# Patient Record
Sex: MALE | Race: WHITE
[De-identification: names, ages, dates, MRNs, and addresses within clinical notes are randomized per-mention and may not be internally consistent; named-entity substitution may affect disease eponyms.]

---

## 2020-04-13 ENCOUNTER — HOSPITAL ENCOUNTER (EMERGENCY)
Dept: HOSPITAL 76 - ED | Age: 55
Discharge: HOME | End: 2020-04-13
Payer: SELF-PAY

## 2020-04-13 VITALS — DIASTOLIC BLOOD PRESSURE: 113 MMHG | SYSTOLIC BLOOD PRESSURE: 210 MMHG

## 2020-04-13 DIAGNOSIS — Z91.14: ICD-10-CM

## 2020-04-13 DIAGNOSIS — R00.0: Primary | ICD-10-CM

## 2020-04-13 DIAGNOSIS — I10: ICD-10-CM

## 2020-04-13 LAB
ALBUMIN DIAFP-MCNC: 4.8 G/DL (ref 3.2–5.5)
ALBUMIN/GLOB SERPL: 1.2 {RATIO} (ref 1–2.2)
ALP SERPL-CCNC: 52 IU/L (ref 42–121)
ALT SERPL W P-5'-P-CCNC: 43 IU/L (ref 10–60)
AMPHET UR QL SCN: NEGATIVE
ANION GAP SERPL CALCULATED.4IONS-SCNC: 12 MMOL/L (ref 6–13)
AST SERPL W P-5'-P-CCNC: 34 IU/L (ref 10–42)
BASOPHILS NFR BLD AUTO: 0.1 10^3/UL (ref 0–0.1)
BASOPHILS NFR BLD AUTO: 0.4 %
BENZODIAZ UR QL SCN: NEGATIVE
BILIRUB BLD-MCNC: 1 MG/DL (ref 0.2–1)
BUN SERPL-MCNC: 14 MG/DL (ref 6–20)
CALCIUM UR-MCNC: 10.1 MG/DL (ref 8.5–10.3)
CHLORIDE SERPL-SCNC: 95 MMOL/L (ref 101–111)
CO2 SERPL-SCNC: 27 MMOL/L (ref 21–32)
COCAINE UR-SCNC: NEGATIVE UMOL/L
CREAT SERPLBLD-SCNC: 0.8 MG/DL (ref 0.6–1.2)
EOSINOPHIL # BLD AUTO: 0 10^3/UL (ref 0–0.7)
EOSINOPHIL NFR BLD AUTO: 0.2 %
ERYTHROCYTE [DISTWIDTH] IN BLOOD BY AUTOMATED COUNT: 13.2 % (ref 12–15)
GLOBULIN SER-MCNC: 3.8 G/DL (ref 2.1–4.2)
GLUCOSE SERPL-MCNC: 165 MG/DL (ref 70–100)
HGB UR QL STRIP: 15 G/DL (ref 14–18)
INR PPP: 1.1 (ref 0.8–1.2)
LIPASE SERPL-CCNC: 28 U/L (ref 22–51)
LYMPHOCYTES # SPEC AUTO: 1.4 10^3/UL (ref 1.5–3.5)
LYMPHOCYTES NFR BLD AUTO: 11.6 %
MCH RBC QN AUTO: 29.3 PG (ref 27–31)
MCHC RBC AUTO-ENTMCNC: 33.4 G/DL (ref 32–36)
MCV RBC AUTO: 87.7 FL (ref 80–94)
METHADONE UR QL SCN: NEGATIVE
METHAMPHET UR QL SCN: NEGATIVE
MONOCYTES # BLD AUTO: 1 10^3/UL (ref 0–1)
MONOCYTES NFR BLD AUTO: 7.9 %
NEUTROPHILS # BLD AUTO: 9.8 10^3/UL (ref 1.5–6.6)
NEUTROPHILS # SNV AUTO: 12.4 X10^3/UL (ref 4.8–10.8)
NEUTROPHILS NFR BLD AUTO: 79.3 %
OPIATES UR QL SCN: POSITIVE
PDW BLD AUTO: 9.6 FL (ref 7.4–11.4)
PLATELET # BLD: 399 10^3/UL (ref 130–450)
PROT SPEC-MCNC: 8.7 G/DL (ref 6.7–8.2)
PROTHROM ACT/NOR PPP: 12.3 SECS (ref 9.9–12.6)
RBC MAR: 5.12 10^6/UL (ref 4.7–6.1)
SODIUM SERPLBLD-SCNC: 134 MMOL/L (ref 135–145)
VOLATILE DRUGS POS SERPL SCN: (no result)

## 2020-04-13 PROCEDURE — 80306 DRUG TEST PRSMV INSTRMNT: CPT

## 2020-04-13 PROCEDURE — 96374 THER/PROPH/DIAG INJ IV PUSH: CPT

## 2020-04-13 PROCEDURE — 99284 EMERGENCY DEPT VISIT MOD MDM: CPT

## 2020-04-13 PROCEDURE — 71045 X-RAY EXAM CHEST 1 VIEW: CPT

## 2020-04-13 PROCEDURE — 83690 ASSAY OF LIPASE: CPT

## 2020-04-13 PROCEDURE — 85025 COMPLETE CBC W/AUTO DIFF WBC: CPT

## 2020-04-13 PROCEDURE — 36415 COLL VENOUS BLD VENIPUNCTURE: CPT

## 2020-04-13 PROCEDURE — 96376 TX/PRO/DX INJ SAME DRUG ADON: CPT

## 2020-04-13 PROCEDURE — 93005 ELECTROCARDIOGRAM TRACING: CPT

## 2020-04-13 PROCEDURE — 80053 COMPREHEN METABOLIC PANEL: CPT

## 2020-04-13 PROCEDURE — 84443 ASSAY THYROID STIM HORMONE: CPT

## 2020-04-13 PROCEDURE — 84484 ASSAY OF TROPONIN QUANT: CPT

## 2020-04-13 PROCEDURE — 85610 PROTHROMBIN TIME: CPT

## 2020-04-13 NOTE — ED PHYSICIAN DOCUMENTATION
PD HPI CHEST PAIN





- Stated complaint


Stated Complaint: CP, BP





- Chief complaint


Chief Complaint: Cardiac





- History obtained from


History obtained from: Patient (This is a 55-year-old gentleman who suddenly 

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 

construction.  This is happened to him before.  He says the last time this 

happened he was in [place], it was about 4 months ago.  He was worked up at 

the time.  He said he had an echocardiogram.  He does not know the results but 

his impression was that nothing significant was wrong with it.  Subsequently was

put on a statin, hydrochlorothiazide, and metoprolol.  He has not been taking 

these regularly.  He did take some hydrochlorothiazide though today.  He denies 

any chest pain or trouble breathing.  He just feels slightly weak and like his 

heart rate is fast.  It feels similar to that last episode 4 months ago.)





Review of Systems


Constitutional: denies: Fever, Chills, Fatigue


Cardiac: reports: Palpitations.  denies: Chest pain / pressure, Pedal edema, 

Calf pain


Respiratory: denies: Dyspnea, Cough, Hemoptysis, Wheezing





PD PAST MEDICAL HISTORY





- Present Medications


Home Medications: 


                                Ambulatory Orders











 Medication  Instructions  Recorded  Confirmed


 


Atorvastatin Calcium 40 mg PO DAILY #60 tablet 04/13/20 


 


Hydrochlorothiazide 12.5 mg PO DAILY #60 tablet 04/13/20 


 


Lisinopril [Prinivil] 10 mg PO DAILY #60 tablet 04/13/20 


 


Metoprolol Tartrate 25 mg PO BID #120 tablet 04/13/20 


 


Pantoprazole [Protonix] 40 mg PO BID #120 tablet 04/13/20 














- Allergies


Allergies/Adverse Reactions: 


                                    Allergies











Allergy/AdvReac Type Severity Reaction Status Date / Time


 


No Known Drug Allergies Allergy   Verified 04/13/20 17:31














PD ED PE NORMAL





- Vitals


Vital signs reviewed: Yes





- General


General: Alert and oriented X 3, No acute distress





- HEENT


HEENT: PERRL, EOMI





- Neck


Neck: Supple, no meningeal sign, No bony TTP





- Cardiac


Cardiac: No murmur, Other (Tachycardic but regular without murmur)





- Respiratory


Respiratory: No respiratory distress, Clear bilaterally





- Abdomen


Abdomen: Soft, Non tender





- Extremities


Extremities: No edema, No calf tenderness / cord





- Neuro


Neuro: Alert and oriented X 3, Normal speech





Results





- Vitals


Vitals: 


                               Vital Signs - 24 hr











  04/13/20 04/13/20 04/13/20





  17:31 17:49 18:01


 


Temperature 36.9 C  


 


Heart Rate 140 H 133 H 101 H


 


Respiratory 16 21 





Rate   


 


Blood Pressure 250/159 H 258/177 H 241/158 H


 


O2 Saturation 98 98 














  04/13/20





  18:10


 


Temperature 


 


Heart Rate 102 H


 


Respiratory 15





Rate 


 


Blood Pressure 224/155 H


 


O2 Saturation 97








                                     Oxygen











O2 Source                      Room air

















- EKG (time done)


  ** 1740


Rate: Rate (enter#) (129)


Rhythm: Sinus tachycardia


Axis: Normal


QRS: LVH


Ischemia: Normal ST segments


Computer interpretation: Agree with computer





- Labs


Labs: 


                                Laboratory Tests











  04/13/20 04/13/20 04/13/20





  17:25 17:47 17:47


 


WBC   12.4 H 


 


RBC   5.12 


 


Hgb   15.0 


 


Hct   44.9 


 


MCV   87.7 


 


MCH   29.3 


 


MCHC   33.4 


 


RDW   13.2 


 


Plt Count   399 


 


MPV   9.6 


 


Neut # (Auto)   9.8 H 


 


Lymph # (Auto)   1.4 L 


 


Mono # (Auto)   1.0 


 


Eos # (Auto)   0.0 


 


Baso # (Auto)   0.1 


 


Absolute Nucleated RBC   0.00 


 


Nucleated RBC %   0.0 


 


PT   


 


INR   


 


Sodium    134 L


 


Potassium    3.3 L


 


Chloride    95 L


 


Carbon Dioxide    27


 


Anion Gap    12.0


 


BUN    14


 


Creatinine    0.8


 


Estimated GFR (MDRD)    101


 


Glucose    165 H


 


Calcium    10.1


 


Total Bilirubin    1.0


 


AST    34


 


ALT    43


 


Alkaline Phosphatase    52


 


Troponin I High Sens   


 


Total Protein    8.7 H


 


Albumin    4.8


 


Globulin    3.8


 


Albumin/Globulin Ratio    1.2


 


Lipase    28


 


TSH   


 


Urine Opiates Screen  POSITIVE H  


 


Ur Oxycodone Screen  NEGATIVE  


 


Urine Methadone Screen  NEGATIVE  


 


Ur Propoxyphene Screen  NEGATIVE  


 


Ur Barbiturates Screen  NEGATIVE  


 


Ur Tricyclics Screen  NEGATIVE  


 


Ur Phencyclidine Scrn  NEGATIVE  


 


Ur Amphetamine Screen  NEGATIVE  


 


U Methamphetamines Scrn  NEGATIVE  


 


U Benzodiazepines Scrn  NEGATIVE  


 


Urine Cocaine Screen  NEGATIVE  


 


U Cannabinoids Screen  NEGATIVE  














  04/13/20 04/13/20 04/13/20





  17:47 17:47 17:47


 


WBC   


 


RBC   


 


Hgb   


 


Hct   


 


MCV   


 


MCH   


 


MCHC   


 


RDW   


 


Plt Count   


 


MPV   


 


Neut # (Auto)   


 


Lymph # (Auto)   


 


Mono # (Auto)   


 


Eos # (Auto)   


 


Baso # (Auto)   


 


Absolute Nucleated RBC   


 


Nucleated RBC %   


 


PT   12.3 


 


INR   1.1 


 


Sodium   


 


Potassium   


 


Chloride   


 


Carbon Dioxide   


 


Anion Gap   


 


BUN   


 


Creatinine   


 


Estimated GFR (MDRD)   


 


Glucose   


 


Calcium   


 


Total Bilirubin   


 


AST   


 


ALT   


 


Alkaline Phosphatase   


 


Troponin I High Sens  10.9  


 


Total Protein   


 


Albumin   


 


Globulin   


 


Albumin/Globulin Ratio   


 


Lipase   


 


TSH    1.69


 


Urine Opiates Screen   


 


Ur Oxycodone Screen   


 


Urine Methadone Screen   


 


Ur Propoxyphene Screen   


 


Ur Barbiturates Screen   


 


Ur Tricyclics Screen   


 


Ur Phencyclidine Scrn   


 


Ur Amphetamine Screen   


 


U Methamphetamines Scrn   


 


U Benzodiazepines Scrn   


 


Urine Cocaine Screen   


 


U Cannabinoids Screen   














- Rads (name of study)


  ** Single view chest x-ray


Radiology: EMP read contemporaneously (Normal)





PD MEDICAL DECISION MAKING





- ED course


ED course: 





This is a 55-year-old gentleman who had hypertensive urgency a couple of months 

ago and was in the hospital in [place].  He has been noncompliant with the 

prescriptions he was written for, which included but were not limited to 

hydrochlorothiazide, lisinopril, and metoprolol.  He did take a dose of 

hydrochlorothiazide today after his symptoms started but had not taken any of 

the others.  He was administered divided doses of IV metoprolol here with 

significant improvement in his vital signs and he remained relatively 

asymptomatic.  Lab work was reassuring.  He was counseled at length that he 

really needs to take his blood pressure medicines to avoid the significant 

cardiovascular risk that will come if he does not take care of himself.





Departure





- Departure


Disposition: 01 Home, Self Care


Clinical Impression: 


 Sinus tachycardia





Hypertension


Qualifiers:


 Hypertension type: essential hypertension Qualified Code(s): I10 - Essential 

(primary) hypertension





Condition: Good


Record reviewed to determine appropriate education?: Yes


Instructions:  ED Hypertension Conf Out Of Control


Follow-Up: 


[facility] [Provider Group]


[facility] [Provider Group]


Prescriptions: 


Atorvastatin Calcium 40 mg PO DAILY #60 tablet


Hydrochlorothiazide 12.5 mg PO DAILY #60 tablet


Lisinopril [Prinivil] 10 mg PO DAILY #60 tablet


Metoprolol Tartrate 25 mg PO BID #120 tablet


Pantoprazole [Protonix] 40 mg PO BID #120 tablet


Comments: 


It is imperative that you take your blood pressure medicines as prescribed, your

 blood pressure is significantly elevated without medications and if you do not 

take the meds you could have a heart attack or stroke or other significant 

health issue.  Return if you develop chest pain or trouble breathing or any 

other worrisome symptoms.  It is also important to follow-up with a local 

primary care physician, several are listed for you on this form.  Call tomorrow 

for appointments.

## 2020-04-13 NOTE — XRAY REPORT
Reason:  Chest pain

Procedure Date:  04/13/2020   

Accession Number:  [id number] / [id number]                    

Procedure:  XR  - Chest 1 View X-Ray CPT Code:  17640

 

***Final Report***

 

 

FULL RESULT:

 

 

EXAM:

CHEST RADIOGRAPHY

 

EXAM DATE: 4/13/2020 05:54 PM.

 

CLINICAL HISTORY: Chest pain today.

 

COMPARISON: None.

 

TECHNIQUE: 1 view.

 

FINDINGS:

Lungs/Pleura: No focal opacities evident. No pleural effusion. No 

pneumothorax.

 

Mediastinum: Within exam limitations, the cardiomediastinal contour is 

normal.

 

Other: None.

IMPRESSION: Normal single view chest.

 

[facility]